# Patient Record
Sex: MALE | Race: OTHER | HISPANIC OR LATINO | URBAN - METROPOLITAN AREA
[De-identification: names, ages, dates, MRNs, and addresses within clinical notes are randomized per-mention and may not be internally consistent; named-entity substitution may affect disease eponyms.]

---
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
## 2024-01-01 NOTE — ED PEDIATRIC NURSE NOTE - BREATHING, MLM
2024.    Follow-up:  PCP, [facility] Fri at 8am    DC Instructions: Please call PCP for temperature >100.3F, decreased p.o. Intake, decreased urine output, decreased activity, fussiness, or any other concerns.    Discharge:  <30 minutes    Signed:  Santa Byers MD     May 1, 2024           Spontaneous, unlabored and symmetrical